# Patient Record
Sex: FEMALE | Race: WHITE
[De-identification: names, ages, dates, MRNs, and addresses within clinical notes are randomized per-mention and may not be internally consistent; named-entity substitution may affect disease eponyms.]

---

## 2019-02-28 ENCOUNTER — HOSPITAL ENCOUNTER (EMERGENCY)
Dept: HOSPITAL 87 - ER | Age: 29
Discharge: HOME | End: 2019-02-28
Payer: COMMERCIAL

## 2019-02-28 VITALS — BODY MASS INDEX: 22.96 KG/M2 | WEIGHT: 134.48 LBS | HEIGHT: 64 IN

## 2019-02-28 VITALS — DIASTOLIC BLOOD PRESSURE: 69 MMHG | SYSTOLIC BLOOD PRESSURE: 109 MMHG

## 2019-02-28 DIAGNOSIS — F17.200: ICD-10-CM

## 2019-02-28 DIAGNOSIS — J45.901: Primary | ICD-10-CM

## 2019-02-28 PROCEDURE — 96374 THER/PROPH/DIAG INJ IV PUSH: CPT

## 2019-02-28 PROCEDURE — 81025 URINE PREGNANCY TEST: CPT

## 2019-02-28 PROCEDURE — 71045 X-RAY EXAM CHEST 1 VIEW: CPT

## 2019-02-28 PROCEDURE — 94640 AIRWAY INHALATION TREATMENT: CPT

## 2019-02-28 PROCEDURE — 93005 ELECTROCARDIOGRAM TRACING: CPT

## 2019-02-28 PROCEDURE — 99283 EMERGENCY DEPT VISIT LOW MDM: CPT

## 2022-10-04 ENCOUNTER — OFFICE VISIT (OUTPATIENT)
Dept: GYNECOLOGY | Facility: CLINIC | Age: 32
End: 2022-10-04
Payer: COMMERCIAL

## 2022-10-04 VITALS
WEIGHT: 205.2 LBS | BODY MASS INDEX: 28.73 KG/M2 | HEIGHT: 71 IN | SYSTOLIC BLOOD PRESSURE: 132 MMHG | DIASTOLIC BLOOD PRESSURE: 84 MMHG

## 2022-10-04 DIAGNOSIS — Z01.419 WOMEN'S ANNUAL ROUTINE GYNECOLOGICAL EXAMINATION: Primary | ICD-10-CM

## 2022-10-04 DIAGNOSIS — F90.9 ADULT ADHD: ICD-10-CM

## 2022-10-04 DIAGNOSIS — Z86.39 HISTORY OF BEING OBESE: ICD-10-CM

## 2022-10-04 PROCEDURE — 99395 PREV VISIT EST AGE 18-39: CPT | Performed by: OBSTETRICS & GYNECOLOGY

## 2022-10-04 RX ORDER — DEXTROAMPHETAMINE SACCHARATE, AMPHETAMINE ASPARTATE, DEXTROAMPHETAMINE SULFATE AND AMPHETAMINE SULFATE 3.75; 3.75; 3.75; 3.75 MG/1; MG/1; MG/1; MG/1
15 TABLET ORAL 2 TIMES DAILY
COMMUNITY
Start: 2022-09-16 | End: 2022-10-16

## 2022-10-04 RX ORDER — TOPIRAMATE 25 MG/1
25 TABLET ORAL 2 TIMES DAILY
COMMUNITY
Start: 2022-08-30

## 2022-10-04 RX ORDER — DEXTROAMPHETAMINE SACCHARATE, AMPHETAMINE ASPARTATE MONOHYDRATE, DEXTROAMPHETAMINE SULFATE AND AMPHETAMINE SULFATE 7.5; 7.5; 7.5; 7.5 MG/1; MG/1; MG/1; MG/1
30 CAPSULE, EXTENDED RELEASE ORAL DAILY
COMMUNITY
Start: 2022-09-16

## 2022-10-04 RX ORDER — BUPROPION HYDROCHLORIDE 150 MG/1
150 TABLET ORAL EVERY MORNING
COMMUNITY
Start: 2022-08-26

## 2022-10-04 NOTE — PROGRESS NOTES
Assessment     Annual well-woman exam    Obesity     Adult ADHD        Plan     Reviewed self-breast exam    Continue weight loss management program   All questions answered  Await pap smear results  Social history  Works as a , in same-sex relationship, currently females only  She does not smoke cigarettes, rare alcohol consumption denies recreational drugs  Subjective  Here for annual exam     Lourdes Colby is a 32 y o  female nulliparous who presents for annual exam  Currently in a same-sex relationship  She is enrolled in a weight loss management program   She is being treated for ADHD with Adderall  She is on supplemental Topamax as well as  Wellbutrin 150 mg XL daily  So far she has lost more than 20 lb  We did discuss review recommended diet and exercise  No new gyn complaints or concerns  Periods are regular every 28-30 days, lasting 5 days  Dysmenorrhea:none  Cyclic symptoms include none  No intermenstrual bleeding, spotting, or discharge  The patient reports that there is not domestic violence in her life  Current contraception: none  History of abnormal Pap smear: no  Family history of uterine or ovarian cancer: no  Regular self breast exam: yes  History of abnormal mammogram: no  Family history of breast cancer: no  History of abnormal lipids: no  Menstrual History:  OB History        0    Para   0    Term   0       0    AB   0    Living   0       SAB   0    IAB   0    Ectopic   0    Multiple   0    Live Births   0                Menarche age: 15  Patient's last menstrual period was 2022 (exact date)  Review of Systems  Pertinent items are noted in HPI        Objective   No acute distress   /84 (BP Location: Right arm, Patient Position: Sitting, Cuff Size: Standard)   Ht 5' 11" (1 803 m)   Wt 93 1 kg (205 lb 3 2 oz)   LMP 2022 (Exact Date)   BMI 28 62 kg/m²     General:   alert and oriented, in no acute distress, alert, appears stated age and cooperative   Heart: regular rate and rhythm, S1, S2 normal, no murmur, click, rub or gallop   Lungs: clear to auscultation bilaterally   Abdomen: soft, non-tender, without masses or organomegaly   Vulva: normal, Bartholin's, Urethra, [place]'s normal, female escutcheon   Vagina: normal mucosa, normal discharge, no palpable nodules   Cervix: anteverted, no bleeding following Pap, no cervical motion tenderness, no lesions and nulliparous appearance   Uterus: normal size, anteverted, mobile, non-tender, normal shape and consistency   Adnexa: normal adnexa and no mass, fullness, tenderness   Bilateral breast exam in the sitting and supine position with chaperone present, no visible or palpable breast lesions identified  No breast masses noted  No supraclavicular or axillary lymphadenopathy noted  No nipple discharge  Reviewed self-breast exam techniques     Rectal exam,  deferred

## 2022-10-09 LAB
CYTOLOGIST CVX/VAG CYTO: NORMAL
DX ICD CODE: NORMAL
HPV I/H RISK 4 DNA CVX QL PROBE+SIG AMP: NEGATIVE
OTHER STN SPEC: NORMAL
PATH REPORT.FINAL DX SPEC: NORMAL
PATHOLOGIST CVX/VAG CYTO: NORMAL
SL AMB NOTE:: NORMAL
SL AMB SPECIMEN ADEQUACY: NORMAL
SL AMB TEST METHODOLOGY: NORMAL

## 2022-10-13 ENCOUNTER — APPOINTMENT (OUTPATIENT)
Dept: URGENT CARE | Age: 32
End: 2022-10-13

## 2023-01-27 ENCOUNTER — APPOINTMENT (OUTPATIENT)
Dept: URGENT CARE | Age: 33
End: 2023-01-27

## 2023-03-23 ENCOUNTER — APPOINTMENT (OUTPATIENT)
Dept: PHYSICAL THERAPY | Facility: MEDICAL CENTER | Age: 33
End: 2023-03-23

## 2023-09-06 ENCOUNTER — OFFICE VISIT (OUTPATIENT)
Dept: PODIATRY | Facility: CLINIC | Age: 33
End: 2023-09-06
Payer: COMMERCIAL

## 2023-09-06 VITALS
DIASTOLIC BLOOD PRESSURE: 80 MMHG | SYSTOLIC BLOOD PRESSURE: 120 MMHG | BODY MASS INDEX: 30.96 KG/M2 | HEART RATE: 85 BPM | WEIGHT: 222 LBS

## 2023-09-06 DIAGNOSIS — L60.0 INGROWING NAIL: Primary | ICD-10-CM

## 2023-09-06 DIAGNOSIS — M79.674 PAIN IN TOE OF RIGHT FOOT: ICD-10-CM

## 2023-09-06 PROCEDURE — 99202 OFFICE O/P NEW SF 15 MIN: CPT | Performed by: PODIATRIST

## 2023-09-06 PROCEDURE — 11730 AVULSION NAIL PLATE SIMPLE 1: CPT | Performed by: PODIATRIST

## 2023-09-06 RX ORDER — LIDOCAINE HYDROCHLORIDE 10 MG/ML
1 INJECTION, SOLUTION EPIDURAL; INFILTRATION; INTRACAUDAL; PERINEURAL ONCE
Status: COMPLETED | OUTPATIENT
Start: 2023-09-06 | End: 2023-09-06

## 2023-09-06 RX ORDER — LIDOCAINE HYDROCHLORIDE AND EPINEPHRINE 10; 10 MG/ML; UG/ML
1 INJECTION, SOLUTION INFILTRATION; PERINEURAL ONCE
Status: COMPLETED | OUTPATIENT
Start: 2023-09-06 | End: 2023-09-06

## 2023-09-06 RX ADMIN — LIDOCAINE HYDROCHLORIDE 1 ML: 10 INJECTION, SOLUTION EPIDURAL; INFILTRATION; INTRACAUDAL; PERINEURAL at 10:41

## 2023-09-06 RX ADMIN — LIDOCAINE HYDROCHLORIDE AND EPINEPHRINE 1 ML: 10; 10 INJECTION, SOLUTION INFILTRATION; PERINEURAL at 10:43

## 2023-09-06 NOTE — PROGRESS NOTES
Assessment/Plan:    Explained to patient that she is dealing with a chronic ingrown nail along the medial nail border of the right great toe. Discussed treatment options recommending partial avulsion. The patient is aware that ingrown nail will return in approximately 4-6 months with this procedure. A partial matrixectomy may be needed in the future. Anesthesia via 3 cc of a 1:1 mixture of 1 percent xylocaine with epinephrine and 1percent xylocaine plain. A Betadine prep was performed. The medial nail border of the right great toe was avulsed to the eponychium. A bacitracin dressing was applied. The patient is to soak in warm water twice a day followed by a Neosporin dressing tomorrow. Patient will contact me should she have pain in the future or difficulty healing. No problem-specific Assessment & Plan notes found for this encounter. Diagnoses and all orders for this visit:    Ingrowing nail  -     lidocaine (PF) (XYLOCAINE-MPF) 1 % injection 1 mL  -     lidocaine-epinephrine (XYLOCAINE/EPINEPHRINE) 1 %-1:100,000 injection 1 mL          Subjective:      Patient ID: Dalia Mclean is a 28 y.o. female. HPI     Patient, a 29-year-old female presents with an intermittently painful ingrown nail affecting the medial nail border of the right great toe. She notes that the toe has been infected in the past.    The following portions of the patient's history were reviewed and updated as appropriate: allergies, current medications, past family history, past medical history, past social history, past surgical history and problem list.    Review of Systems   Gastrointestinal: Negative. Musculoskeletal: Negative. Psychiatric/Behavioral: Negative. Objective: There were no vitals taken for this visit. Physical Exam  Constitutional:       Appearance: She is obese. Cardiovascular:      Pulses: Normal pulses. Musculoskeletal:         General: Normal range of motion. Skin:     Comments: Pain with palpation medial nail border right hallux. No sign of infection. Nail removal    Date/Time: 9/6/2023 8:30 AM    Performed by: Kellie oPol DPM  Authorized by: Kellie Pool DPM    Patient location:  [facility] Protocol:  Consent: Verbal consent obtained. Risks and benefits: risks, benefits and alternatives were discussed  Consent given by: patient  Patient understanding: patient states understanding of the procedure being performed  Patient identity confirmed: verbally with patient      Location:     Foot:  R big toe  Pre-procedure details:     Skin preparation:  Betadine    Preparation: Patient was prepped and draped in the usual sterile fashion    Anesthesia (see MAR for exact dosages): Anesthesia method:  Nerve block    Block needle gauge:  25 G    Block anesthetic:  Lidocaine 1% WITH epi and lidocaine 1% w/o epi    Block injection procedure:  Anatomic landmarks identified    Block outcome:  Anesthesia achieved  Nail Removal:     Nail removed:  Partial    Nail side:  Medial    Nail bed sutured: no    Ingrown nail:     Wedge excision of skin: no      Nail matrix removed or ablated:  None  Post-procedure details:     Dressing:  4x4 sterile gauze, antibiotic ointment and gauze roll    Patient tolerance of procedure:   Tolerated well, no immediate complications

## 2023-12-05 ENCOUNTER — TELEPHONE (OUTPATIENT)
Dept: PODIATRY | Facility: CLINIC | Age: 33
End: 2023-12-05

## 2023-12-11 ENCOUNTER — ANNUAL EXAM (OUTPATIENT)
Dept: GYNECOLOGY | Facility: CLINIC | Age: 33
End: 2023-12-11
Payer: COMMERCIAL

## 2023-12-11 VITALS
HEIGHT: 71 IN | WEIGHT: 225.6 LBS | DIASTOLIC BLOOD PRESSURE: 82 MMHG | SYSTOLIC BLOOD PRESSURE: 116 MMHG | BODY MASS INDEX: 31.58 KG/M2

## 2023-12-11 DIAGNOSIS — Z01.419 WOMEN'S ANNUAL ROUTINE GYNECOLOGICAL EXAMINATION: ICD-10-CM

## 2023-12-11 DIAGNOSIS — R35.0 URINARY FREQUENCY: ICD-10-CM

## 2023-12-11 DIAGNOSIS — R30.0 DYSURIA: ICD-10-CM

## 2023-12-11 LAB
SL AMB  POCT GLUCOSE, UA: NORMAL
SL AMB LEUKOCYTE ESTERASE,UA: NORMAL
SL AMB POCT BILIRUBIN,UA: NORMAL
SL AMB POCT BLOOD,UA: NORMAL
SL AMB POCT CLARITY,UA: NORMAL
SL AMB POCT COLOR,UA: YELLOW
SL AMB POCT KETONES,UA: NORMAL
SL AMB POCT NITRITE,UA: NORMAL
SL AMB POCT PH,UA: 5
SL AMB POCT SPECIFIC GRAVITY,UA: 1.02
SL AMB POCT URINE PROTEIN: NORMAL
SL AMB POCT UROBILINOGEN: 0.2

## 2023-12-11 PROCEDURE — 81002 URINALYSIS NONAUTO W/O SCOPE: CPT | Performed by: OBSTETRICS & GYNECOLOGY

## 2023-12-11 PROCEDURE — S0612 ANNUAL GYNECOLOGICAL EXAMINA: HCPCS | Performed by: OBSTETRICS & GYNECOLOGY

## 2023-12-11 NOTE — PROGRESS NOTES
Assessment   Annual well woman exam  Adult ADHD  Obesity          Plan   Normal Pap in  with negative HPV testing next Pap due in    All questions answered. Breast self exam technique reviewed and patient encouraged to perform self-exam monthly. Urinalysis. Urine culture and sensitivity. Subjective here for annual exam     Manuel Mcgowan is a 35 y.o. female who presents for annual exam. Periods are regular every 28-30 days, lasting 5 days. Dysmenorrhea:none. Cyclic symptoms include none. No intermenstrual bleeding, spotting, or discharge. Currently in a same-sex relationship. Denies pain with sexual relations the patient reports that there is not domestic violence in her life. Current contraception: none  History of abnormal Pap smear: no  Family history of uterine or ovarian cancer: no  Regular self breast exam: yes  History of abnormal mammogram: no  Family history of breast cancer: no  History of abnormal lipids: no  Menstrual History:  OB History          0    Para   0    Term   0       0    AB   0    Living   0         SAB   0    IAB   0    Ectopic   0    Multiple   0    Live Births   0                Menarche age: 15  No LMP recorded. Review of Systems  Pertinent items are noted in HPI. Objective no acute distress     There were no vitals taken for this visit.     General:   alert and oriented, in no acute distress, alert, appears stated age, and cooperative   Heart: regular rate and rhythm, S1, S2 normal, no murmur, click, rub or gallop   Lungs: clear to auscultation bilaterally and normal percussion bilaterally   Abdomen: soft, non-tender, without masses or organomegaly   Vulva: normal, Bartholin's, Urethra, [place]'s normal, female escutcheon   Vagina: normal mucosa, normal discharge, no palpable nodules   Cervix: anteverted   Uterus: normal size, anteverted, mobile, non-tender, normal shape and consistency   Adnexa: normal adnexa and no mass, fullness, tenderness   Bilateral breast exam in the sitting and supine position with chaperone present, no visible or palpable breast lesions identified. No breast masses noted. No supraclavicular or axillary lymphadenopathy noted. No nipple discharge. Reviewed self-breast exam techniques.    Rectal exam,  deferred

## 2023-12-13 LAB
APPEARANCE UR: CLEAR
BACTERIA URNS QL MICRO: ABNORMAL
BILIRUB UR QL STRIP: NEGATIVE
CASTS URNS QL MICRO: ABNORMAL /LPF
COLOR UR: YELLOW
EPI CELLS #/AREA URNS HPF: ABNORMAL /HPF (ref 0–10)
GLUCOSE UR QL: NEGATIVE
HGB UR QL STRIP: NEGATIVE
KETONES UR QL STRIP: NEGATIVE
LEUKOCYTE ESTERASE UR QL STRIP: ABNORMAL
MICRO URNS: ABNORMAL
NITRITE UR QL STRIP: POSITIVE
PH UR STRIP: 6 [PH] (ref 5–7.5)
PROT UR QL STRIP: NEGATIVE
RBC #/AREA URNS HPF: ABNORMAL /HPF (ref 0–2)
SP GR UR: 1.02 (ref 1–1.03)
UROBILINOGEN UR STRIP-ACNC: 0.2 MG/DL (ref 0.2–1)
WBC #/AREA URNS HPF: ABNORMAL /HPF (ref 0–5)

## 2023-12-14 ENCOUNTER — TELEPHONE (OUTPATIENT)
Dept: GYNECOLOGY | Facility: CLINIC | Age: 33
End: 2023-12-14

## 2023-12-14 NOTE — TELEPHONE ENCOUNTER
Patient called back. She saw on MyChart that some elements of her UA are marked as abnormal.  She will wait for Dr. Pineda Sy final evaluation.

## 2024-01-22 ENCOUNTER — TELEPHONE (OUTPATIENT)
Dept: PODIATRY | Facility: CLINIC | Age: 34
End: 2024-01-22

## 2024-01-22 ENCOUNTER — TELEPHONE (OUTPATIENT)
Age: 34
End: 2024-01-22

## 2024-01-22 NOTE — TELEPHONE ENCOUNTER
I called and left a message informing pt that she will have to contact her insurance company to find out do she needs another referral.

## 2024-01-22 NOTE — TELEPHONE ENCOUNTER
Caller: Beto Little    Doctor/Office: Dr. Mcleod/Marnie MCBRIDE#: [phone]    Escalation: Appointment Patient scheduled a follow up and would like a return call to let her know if she needs a new insurance referral. Was the last one only good for one visit? Please advise. Thank you

## 2024-02-28 ENCOUNTER — OFFICE VISIT (OUTPATIENT)
Dept: PODIATRY | Facility: CLINIC | Age: 34
End: 2024-02-28
Payer: COMMERCIAL

## 2024-02-28 VITALS — HEART RATE: 75 BPM | SYSTOLIC BLOOD PRESSURE: 129 MMHG | DIASTOLIC BLOOD PRESSURE: 85 MMHG

## 2024-02-28 DIAGNOSIS — M79.675 PAIN IN TOE OF LEFT FOOT: Primary | ICD-10-CM

## 2024-02-28 DIAGNOSIS — L60.0 INGROWING NAIL: ICD-10-CM

## 2024-02-28 PROCEDURE — 99212 OFFICE O/P EST SF 10 MIN: CPT | Performed by: PODIATRIST

## 2024-02-28 NOTE — PROGRESS NOTES
Patient presents for concern regarding the medial border of the right great toenail.  Patient had partial avulsion performed along the medial and lateral September and is concerned that she may need a similar procedure.    On exam, there is no evidence of an ingrown nail at this time.  There is no pain with palpation.  Advised patient to try to avoid shoes that placed extra pressure on the toe.  No additional treatment needed.  Reappoint as needed

## 2024-10-29 ENCOUNTER — TELEPHONE (OUTPATIENT)
Dept: GYNECOLOGY | Facility: CLINIC | Age: 34
End: 2024-10-29

## 2024-12-10 ENCOUNTER — APPOINTMENT (OUTPATIENT)
Dept: URGENT CARE | Age: 34
End: 2024-12-10